# Patient Record
Sex: FEMALE | NOT HISPANIC OR LATINO | Employment: FULL TIME | URBAN - METROPOLITAN AREA
[De-identification: names, ages, dates, MRNs, and addresses within clinical notes are randomized per-mention and may not be internally consistent; named-entity substitution may affect disease eponyms.]

---

## 2024-04-02 ENCOUNTER — APPOINTMENT (OUTPATIENT)
Dept: ENDOCRINOLOGY | Facility: CLINIC | Age: 44
End: 2024-04-02
Payer: COMMERCIAL

## 2024-05-15 ENCOUNTER — OFFICE VISIT (OUTPATIENT)
Dept: RHEUMATOLOGY | Facility: CLINIC | Age: 44
End: 2024-05-15
Payer: COMMERCIAL

## 2024-05-15 VITALS
HEART RATE: 59 BPM | BODY MASS INDEX: 27 KG/M2 | SYSTOLIC BLOOD PRESSURE: 99 MMHG | DIASTOLIC BLOOD PRESSURE: 65 MMHG | WEIGHT: 172 LBS | HEIGHT: 67 IN

## 2024-05-15 DIAGNOSIS — R76.8 P-ANCA AND MPO ANTIBODIES POSITIVE: Primary | ICD-10-CM

## 2024-05-15 PROBLEM — E06.3 HYPOTHYROIDISM DUE TO HASHIMOTO'S THYROIDITIS: Status: ACTIVE | Noted: 2019-10-31

## 2024-05-15 PROBLEM — E03.8 HYPOTHYROIDISM DUE TO HASHIMOTO'S THYROIDITIS: Status: ACTIVE | Noted: 2019-10-31

## 2024-05-15 PROBLEM — G43.009 MIGRAINE WITHOUT AURA AND WITHOUT STATUS MIGRAINOSUS, NOT INTRACTABLE: Status: ACTIVE | Noted: 2019-10-31

## 2024-05-15 PROCEDURE — 1036F TOBACCO NON-USER: CPT | Performed by: INTERNAL MEDICINE

## 2024-05-15 PROCEDURE — 99204 OFFICE O/P NEW MOD 45 MIN: CPT | Performed by: INTERNAL MEDICINE

## 2024-05-15 RX ORDER — LACTOBACILLUS COMBINATION NO.4 3B CELL
CAPSULE ORAL
COMMUNITY

## 2024-05-15 RX ORDER — SUMATRIPTAN SUCCINATE 100 MG/1
TABLET ORAL
COMMUNITY
Start: 2023-09-18

## 2024-05-15 RX ORDER — LEVOTHYROXINE SODIUM 100 UG/1
1 CAPSULE ORAL
COMMUNITY
Start: 2023-11-21 | End: 2025-04-12

## 2024-05-15 RX ORDER — LIOTHYRONINE SODIUM 5 UG/1
5 TABLET ORAL
COMMUNITY
Start: 2023-11-21 | End: 2024-11-20

## 2024-05-15 RX ORDER — LEVOTHYROXINE SODIUM 25 UG/1
1 CAPSULE ORAL
COMMUNITY
Start: 2023-11-21 | End: 2025-04-12

## 2024-05-15 NOTE — PROGRESS NOTES
Initial Rheumatology Patient Visit    Chief Complaint:  Lorene Marie is a 43 y.o. female presenting today for Abnormal Lab.    History of Presenting Problem:  44 y/o female with some abnormal blood work present for evaluation.  She has had seen neurologist who did a lot of blood work .  Per patient she was being evaluated for concerns about MS. At the time she has symptoms of weakness but this has resolved.  A workup was done last year she also had an MRI which was normal.  As part of her comprehensive workup an ANCA was checked which was positive.  She had an atypical panel, with weak MPO positivity    Problem List:   Patient Active Problem List   Diagnosis    Hypothyroidism due to Hashimoto's thyroiditis    Migraine without aura and without status migrainosus, not intractable       Past Medical History:   Past Medical History:   Diagnosis Date    Hashimoto's thyroiditis     Vitiligo        Surgical History: No past surgical history on file.     Allergies: No Known Allergies    Medications:   Current Outpatient Medications:     lactobacillus combination no.4 (Probiotic) 3 billion cell capsule, Take by mouth., Disp: , Rfl:     levothyroxine (Tirosint) 100 mcg capsule, Take 1 capsule (100 mcg) by mouth once daily in the morning. Take before meals., Disp: , Rfl:     levothyroxine (Tirosint) 25 mcg capsule, Take 1 capsule (25 mcg) by mouth once daily in the morning. Take before meals., Disp: , Rfl:     liothyronine (Cytomel) 5 mcg tablet, Take 1 tablet (5 mcg) by mouth once daily., Disp: , Rfl:     SUMAtriptan (Imitrex) 100 mg tablet, TAKE 1 TABLET BY MOUTH AS NEEDED FOR MIGRAINE. REPEAT IN 2 TO 3 HOURS IF NEEDED, Disp: , Rfl:     Review of Systems:   ROS: Denies Morning stiffness, Denies joint pain and swelling, Denies dry eyes and mouth, Denies oral, nasal or genital ulcers, Denies sun sensitivity, Denies Raynaud's phenomenon. Denies Uveitis or recent vision changes. Denies new rashes or Hx of Psoriasis, Denies  "alopecia,   Denies Chest pain/SOB, cough, Hx of asthma, Denies Fever/chills/sweats, Denies Fatigue, Denies weight loss  Denies abdominal pain, Denies  dysphasia, nausea/vomiting/diarrhea, dark/tarry stools, Denies blood in stools or urine. Denies Chronic back pain.   Denies Hx of blood clot or miscarriages. Hx of easy bruising or bleeding. She report Chronic migraines , she is reporting numbness and tingling in the left aspect of her thorax under her breast area has been going on for a month, denies weakness.  All other systems have been reviewed and are negative for complaint.     Objective   Physical Examination:    Visit Vitals  BP 99/65   Pulse 59   Ht 1.71 m (5' 7.32\")   Wt 78 kg (172 lb)   BMI 26.68 kg/m²   BSA 1.92 m²        Gen: NAD, A&O x 3  HEENT: clear sclera and conjunctiva,     Musculoskeletal:   Neck; WNL, full ROM  Shoulder: WNL, full ROM  Elbow:WNL, full ROM, no effusion noted  Wrist and fingers;no active synovitis noted, Full ROM in the Wrist , Good fist and   Knees:  No effusions or crepitation, full ROM.  Hips; WNL, full ROM, Negative Kirk test  Ankle, Feet; WNL, full ROM    Skin: No rashes or lesions seen, no nail changes vitiligo changes noted,   Neuro: A&O x3, Normal Gait    Procedures :None    Orders:  No orders of the defined types were placed in this encounter.       Provider Impression:   Assessment/Plan   Encounter Diagnosis   Name Primary?    P-ANCA and MPO antibodies positive Yes      42 y/o female with some abnormal blood work present for evaluation.  As part of her extensive workup by neurology to rule out concerns for autoimmune neurological condition and ANCA testing was done which showed a weak positive p-ANCA MPO variety.  Given that patient lacks a pretest probability for vasculitis will assume that this is a false positive as her review of systems were negative.  Would recommend patient follow-up with neurology for further workup of paresthesia in her left thorax she " denies history of shingles.  No need for further rheumatology workup

## 2024-05-16 ENCOUNTER — TELEPHONE (OUTPATIENT)
Dept: RHEUMATOLOGY | Facility: CLINIC | Age: 44
End: 2024-05-16
Payer: COMMERCIAL

## 2024-05-16 DIAGNOSIS — R20.2 PARESTHESIA OF SKIN: Primary | ICD-10-CM

## 2024-07-29 ENCOUNTER — APPOINTMENT (OUTPATIENT)
Dept: NEUROLOGY | Facility: HOSPITAL | Age: 44
End: 2024-07-29
Payer: COMMERCIAL

## 2024-09-26 ENCOUNTER — APPOINTMENT (OUTPATIENT)
Dept: RADIOLOGY | Facility: CLINIC | Age: 44
End: 2024-09-26
Payer: COMMERCIAL

## 2024-11-11 ENCOUNTER — APPOINTMENT (OUTPATIENT)
Dept: ENDOCRINOLOGY | Facility: CLINIC | Age: 44
End: 2024-11-11
Payer: COMMERCIAL

## 2024-11-20 ENCOUNTER — APPOINTMENT (OUTPATIENT)
Dept: OBSTETRICS AND GYNECOLOGY | Facility: CLINIC | Age: 44
End: 2024-11-20
Payer: COMMERCIAL

## 2024-11-20 VITALS
DIASTOLIC BLOOD PRESSURE: 60 MMHG | WEIGHT: 164 LBS | BODY MASS INDEX: 24.86 KG/M2 | HEIGHT: 68 IN | SYSTOLIC BLOOD PRESSURE: 108 MMHG

## 2024-11-20 DIAGNOSIS — G43.829 MENSTRUAL MIGRAINE WITHOUT STATUS MIGRAINOSUS, NOT INTRACTABLE: Primary | ICD-10-CM

## 2024-11-20 DIAGNOSIS — Z80.3 FAMILY HISTORY OF BREAST CANCER IN FIRST DEGREE RELATIVE: ICD-10-CM

## 2024-11-20 DIAGNOSIS — Z12.31 ENCOUNTER FOR SCREENING MAMMOGRAM FOR MALIGNANT NEOPLASM OF BREAST: ICD-10-CM

## 2024-11-20 PROCEDURE — 1036F TOBACCO NON-USER: CPT | Performed by: OBSTETRICS & GYNECOLOGY

## 2024-11-20 PROCEDURE — 99386 PREV VISIT NEW AGE 40-64: CPT | Performed by: OBSTETRICS & GYNECOLOGY

## 2024-11-20 PROCEDURE — 3008F BODY MASS INDEX DOCD: CPT | Performed by: OBSTETRICS & GYNECOLOGY

## 2024-11-20 RX ORDER — NORELGESTROMIN AND ETHINYL ESTRADIOL 35; 150 UG/MG; UG/MG
1 PATCH TRANSDERMAL WEEKLY
Qty: 3 PATCH | Refills: 11 | Status: SHIPPED | OUTPATIENT
Start: 2024-11-20 | End: 2025-11-20

## 2024-11-20 RX ORDER — PROGESTERONE 100 MG/1
1 CAPSULE ORAL
COMMUNITY
Start: 2024-10-17

## 2024-11-20 ASSESSMENT — ENCOUNTER SYMPTOMS
DIARRHEA: 0
FREQUENCY: 0
COUGH: 0
NAUSEA: 0
ABDOMINAL PAIN: 0
CONSTIPATION: 0
CHILLS: 0
PALPITATIONS: 0
FEVER: 0
HEMATURIA: 0
VOMITING: 0
HEADACHES: 1
WEAKNESS: 0
DIZZINESS: 0
DYSURIA: 0
SHORTNESS OF BREATH: 0

## 2024-11-20 ASSESSMENT — PAIN SCALES - GENERAL: PAINLEVEL_OUTOF10: 0-NO PAIN

## 2024-11-20 NOTE — PROGRESS NOTES
Menstrual migraines  Taking Prometrium  Taking bio-identical progesterone    Worse migraines with menstrual cycle day 3-5        Breast screening MRI/mammogram

## 2024-11-20 NOTE — PROGRESS NOTES
Well Woman Visit    SUBJECTIVE  44 y.o.  female presents for well woman exam     OB/GYN History  OB History    Para Term  AB Living   0 0 0 0 0 0   SAB IAB Ectopic Multiple Live Births   0 0 0 0 0       Menstrual status: Having periods  Patient's last menstrual period was 2024 (exact date).  Menses: reports regular menses  Abnormal bleeding: No  Discharge: No  Pelvic pain: No    Vasomotor symptoms: Yes - reporting some symptoms, mild in nature  Genitourinary symptoms: No  Sleep concerns: No  Mood concerns: No  Hormone therapy: Yes - taking Prometrium    Social History     Substance and Sexual Activity   Sexual Activity Not on file     Sexually transmitted infections:no past history  History of abnormal pap: Yes - remote  Sexual concerns: No    Other: Menstrual migraines     The following portions of the chart were reviewed this encounter and updated as appropriate:    Tobacco  Allergies  Meds  Problems  Med Hx  Surg Hx  Fam Hx          Screenings  Social Drivers of Health     Tobacco Use: Low Risk  (2024)    Patient History     Smoking Tobacco Use: Never     Smokeless Tobacco Use: Never     Passive Exposure: Not on file   Alcohol Use: Not At Risk (2023)    Received from [facility]    AUDIT-C     Frequency of Alcohol Consumption: Never     Average Number of Drinks: Patient does not drink     Frequency of Binge Drinking: Never   Financial Resource Strain: Not on File (2024)    Received from [facility]    Financial Resource Strain     Financial Resource Strain: 0   Food Insecurity: Not on File (2024)    Received from [facility]    Food Insecurity     Food: 0   Transportation Needs: Not on File (2024)    Received from [facility]    Transportation Needs     Transportation: 0   Physical Activity: Not on File (2024)    Received from [facility]    Physical Activity     Physical Activity: 0   Stress: Not on File (2024)    Received from  "[facility]    Stress     Stress: 0   Social Connections: Not on File (2024)    Received from [facility]    Social Connections     Connectedness: 0   Intimate Partner Violence: Not on file   Depression: Not at risk (10/4/2023)    Received from [facility]    PHQ-2     PHQ-2 score: 2   Housing Stability: Not on File (2024)    Received from [facility]    Housing Stability     Housin   Utilities: Not on file   Digital Equity: Not on file   Health Literacy: Not on file         Hereditary Cancer Risk Assessment:   Family history of breast, ovarian, uterine, colon, pancreatic, and/or prostate cancer:  Family History   Problem Relation Name Age of Onset    Breast cancer Mother      Ovarian cancer Neg Hx      Uterine cancer Neg Hx      Colon cancer Neg Hx          Review of Systems  Review of Systems   Constitutional:  Negative for chills and fever.   Eyes:  Negative for visual disturbance.   Respiratory:  Negative for cough and shortness of breath.    Cardiovascular:  Negative for chest pain and palpitations.   Gastrointestinal:  Negative for abdominal pain, constipation, diarrhea, nausea and vomiting.   Endocrine: Positive for heat intolerance.   Genitourinary:  Negative for dyspareunia, dysuria, frequency, hematuria, urgency, vaginal bleeding and vaginal discharge.   Neurological:  Positive for headaches. Negative for dizziness and weakness.         OBJECTIVE  Vitals:    24 1315   BP: 108/60   Weight: 74.4 kg (164 lb)   Height: 1.727 m (5' 8\")     Body mass index is 24.94 kg/m².      Physical Exam  Constitutional:       General: She is not in acute distress.     Appearance: Normal appearance.   Genitourinary:      Vulva and rectum normal.      Right Labia: No skin changes.     Left Labia: No skin changes.     No vaginal discharge.        Right Adnexa: not tender and no mass present.     Left Adnexa: not tender and no mass present.     No cervical lesion.      Uterus is not tender " "or irregular.   Breasts:     Breasts are symmetrical.      Right: Normal.      Left: Normal.   HENT:      Head: Normocephalic and atraumatic.      Nose: Nose normal.      Mouth/Throat:      Mouth: Mucous membranes are moist.      Pharynx: Oropharynx is clear.   Eyes:      Extraocular Movements: Extraocular movements intact.      Conjunctiva/sclera: Conjunctivae normal.      Pupils: Pupils are equal, round, and reactive to light.   Cardiovascular:      Rate and Rhythm: Normal rate.      Pulses: Normal pulses.   Pulmonary:      Effort: Pulmonary effort is normal.   Abdominal:      General: Abdomen is flat. There is no distension.      Palpations: Abdomen is soft.      Tenderness: There is no abdominal tenderness. There is no guarding or rebound.   Musculoskeletal:         General: Normal range of motion.   Neurological:      General: No focal deficit present.      Mental Status: She is alert and oriented to person, place, and time.   Skin:     General: Skin is warm and dry.   Psychiatric:         Mood and Affect: Mood normal.         Behavior: Behavior normal.   Vitals reviewed.           Last pap: approximate date 04/2022 and was normal  Last mammogram: approximate date 09/2023 and was normal  Last DEXA: not indicated   Last colonoscopy: not indicated       There is no immunization history on file for this patient.      ASSESSMENT & PLAN  -Well woman screenings performed today  -Reviewed cervical, breast, and colon cancer screening recommendations  -Discussed menopause expectations  -Encouraged routine wellness exams with PCP     -Issues identified and addressed today:   Problem List Items Addressed This Visit          Ob-Gyn Problems    Family history of breast cancer in first degree relative    Overview     - Patient has previously seen high risk breast clinic at [facility]  - From that visit \"Her lifetime risk of breast cancer is estimated to be 30.2%. She meets ACS criteria for screening breast MRI and will be followed " "twice yearly with clinical exams as well as annual DBT beginning now, alternating with screening breast MRI after she turns 42.\"          Relevant Orders    BI mammo bilateral screening tomosynthesis    MR breast bilateral w IV contrast fast screening self pay       Other    Menstrual migraine - Primary    Overview     - Reports worsening migraines on Day 3-5 of menstrual cycle, she has had this for a long time but has worsened with time  - Currently has regular cycles and possible mild perimenopausal symptoms  - Taking Prometrium 100 mg with second half of cycle for progesterone support, interested in adding MHT estrogen patches to see if this helps her migraines as recommended by her naturopathic provider  - Reviewed that early perimenopausal symptoms and her menstrual migraines are likely best treated with combined hormonal contraceptive dosing of estrogen and progesterone that will suppress the normal ovulatory cycles and larger fluctuations of hormones. Reviewed that hormone replacement dosing is not likely to prevent menstrual migraines  - Patient does not have any estrogen contraindications - migraines are without aura  - Reviewed options, interested in trying [facility] patch         Relevant Medications    norelgestromin-ethin.estradioL ([facility]) 150-35 mcg/24 hr     Other Visit Diagnoses       Encounter for screening mammogram for malignant neoplasm of breast        Relevant Orders    BI mammo bilateral screening tomosynthesis    MR breast bilateral w IV contrast fast screening self pay          Follow up 3 months    Smita Roblero MD  Obstetrics & Gynecology  11/20/24   "

## 2025-02-19 ENCOUNTER — APPOINTMENT (OUTPATIENT)
Dept: RADIOLOGY | Facility: CLINIC | Age: 45
End: 2025-02-19
Payer: COMMERCIAL